# Patient Record
Sex: FEMALE | URBAN - METROPOLITAN AREA
[De-identification: names, ages, dates, MRNs, and addresses within clinical notes are randomized per-mention and may not be internally consistent; named-entity substitution may affect disease eponyms.]

---

## 2021-08-05 ENCOUNTER — APPOINTMENT (RX ONLY)
Dept: URBAN - METROPOLITAN AREA CLINIC 159 | Facility: CLINIC | Age: 71
Setting detail: DERMATOLOGY
End: 2021-08-05

## 2021-08-05 DIAGNOSIS — D18.0 HEMANGIOMA: ICD-10-CM

## 2021-08-05 DIAGNOSIS — L57.0 ACTINIC KERATOSIS: ICD-10-CM

## 2021-08-05 DIAGNOSIS — D22 MELANOCYTIC NEVI: ICD-10-CM

## 2021-08-05 DIAGNOSIS — L30.9 DERMATITIS, UNSPECIFIED: ICD-10-CM

## 2021-08-05 DIAGNOSIS — L82.1 OTHER SEBORRHEIC KERATOSIS: ICD-10-CM

## 2021-08-05 DIAGNOSIS — L81.4 OTHER MELANIN HYPERPIGMENTATION: ICD-10-CM

## 2021-08-05 PROBLEM — D18.01 HEMANGIOMA OF SKIN AND SUBCUTANEOUS TISSUE: Status: ACTIVE | Noted: 2021-08-05

## 2021-08-05 PROBLEM — D22.0 MELANOCYTIC NEVI OF LIP: Status: ACTIVE | Noted: 2021-08-05

## 2021-08-05 PROBLEM — D22.72 MELANOCYTIC NEVI OF LEFT LOWER LIMB, INCLUDING HIP: Status: ACTIVE | Noted: 2021-08-05

## 2021-08-05 PROCEDURE — 99203 OFFICE O/P NEW LOW 30 MIN: CPT | Mod: 25

## 2021-08-05 PROCEDURE — ? LIQUID NITROGEN

## 2021-08-05 PROCEDURE — 17000 DESTRUCT PREMALG LESION: CPT

## 2021-08-05 PROCEDURE — ? COUNSELING

## 2021-08-05 ASSESSMENT — LOCATION DETAILED DESCRIPTION DERM
LOCATION DETAILED: LEFT CENTRAL MALAR CHEEK
LOCATION DETAILED: LEFT MEDIAL EYEBROW
LOCATION DETAILED: RIGHT DISTAL POSTERIOR UPPER ARM
LOCATION DETAILED: MIDDLE STERNUM
LOCATION DETAILED: LEFT INSTEP
LOCATION DETAILED: EPIGASTRIC SKIN
LOCATION DETAILED: INFERIOR THORACIC SPINE
LOCATION DETAILED: LEFT UPPER CUTANEOUS LIP
LOCATION DETAILED: RIGHT PROXIMAL POSTERIOR THIGH
LOCATION DETAILED: LEFT RIB CAGE
LOCATION DETAILED: RIGHT RIB CAGE
LOCATION DETAILED: LEFT MEDIAL ELBOW
LOCATION DETAILED: LEFT LATERAL SUPERIOR CHEST
LOCATION DETAILED: RIGHT MEDIAL FRONTAL SCALP
LOCATION DETAILED: RIGHT CENTRAL EYEBROW
LOCATION DETAILED: RIGHT SUPERIOR MEDIAL MALAR CHEEK
LOCATION DETAILED: LEFT INFERIOR MEDIAL UPPER BACK
LOCATION DETAILED: RIGHT PROXIMAL RADIAL DORSAL FOREARM
LOCATION DETAILED: RIGHT DISTAL PRETIBIAL REGION
LOCATION DETAILED: LEFT DISTAL DORSAL FOREARM

## 2021-08-05 ASSESSMENT — LOCATION SIMPLE DESCRIPTION DERM
LOCATION SIMPLE: LEFT UPPER BACK
LOCATION SIMPLE: RIGHT SCALP
LOCATION SIMPLE: RIGHT POSTERIOR THIGH
LOCATION SIMPLE: LEFT PLANTAR SURFACE
LOCATION SIMPLE: RIGHT EYEBROW
LOCATION SIMPLE: LEFT ELBOW
LOCATION SIMPLE: RIGHT CHEEK
LOCATION SIMPLE: RIGHT PRETIBIAL REGION
LOCATION SIMPLE: RIGHT POSTERIOR UPPER ARM
LOCATION SIMPLE: LEFT LIP
LOCATION SIMPLE: LEFT FOREARM
LOCATION SIMPLE: LEFT CHEEK
LOCATION SIMPLE: RIGHT FOREARM
LOCATION SIMPLE: LEFT EYEBROW
LOCATION SIMPLE: CHEST
LOCATION SIMPLE: ABDOMEN
LOCATION SIMPLE: UPPER BACK

## 2021-08-05 ASSESSMENT — LOCATION ZONE DERM
LOCATION ZONE: TRUNK
LOCATION ZONE: SCALP
LOCATION ZONE: LEG
LOCATION ZONE: ARM
LOCATION ZONE: FACE
LOCATION ZONE: FEET
LOCATION ZONE: LIP

## 2021-08-05 NOTE — PROCEDURE: LIQUID NITROGEN

## 2022-09-14 ENCOUNTER — APPOINTMENT (RX ONLY)
Dept: URBAN - METROPOLITAN AREA CLINIC 159 | Facility: CLINIC | Age: 72
Setting detail: DERMATOLOGY
End: 2022-09-14

## 2022-09-14 DIAGNOSIS — D18.0 HEMANGIOMA: ICD-10-CM

## 2022-09-14 DIAGNOSIS — D22 MELANOCYTIC NEVI: ICD-10-CM

## 2022-09-14 DIAGNOSIS — L81.4 OTHER MELANIN HYPERPIGMENTATION: ICD-10-CM

## 2022-09-14 DIAGNOSIS — L82.1 OTHER SEBORRHEIC KERATOSIS: ICD-10-CM

## 2022-09-14 DIAGNOSIS — L82.0 INFLAMED SEBORRHEIC KERATOSIS: ICD-10-CM

## 2022-09-14 PROBLEM — D22.72 MELANOCYTIC NEVI OF LEFT LOWER LIMB, INCLUDING HIP: Status: ACTIVE | Noted: 2022-09-14

## 2022-09-14 PROBLEM — D18.01 HEMANGIOMA OF SKIN AND SUBCUTANEOUS TISSUE: Status: ACTIVE | Noted: 2022-09-14

## 2022-09-14 PROCEDURE — 99213 OFFICE O/P EST LOW 20 MIN: CPT | Mod: 25

## 2022-09-14 PROCEDURE — ? COUNSELING

## 2022-09-14 PROCEDURE — 17110 DESTRUCTION B9 LES UP TO 14: CPT

## 2022-09-14 PROCEDURE — ? LIQUID NITROGEN (CM)

## 2022-09-14 ASSESSMENT — LOCATION ZONE DERM
LOCATION ZONE: HAND
LOCATION ZONE: NECK
LOCATION ZONE: FEET
LOCATION ZONE: LEG
LOCATION ZONE: NOSE
LOCATION ZONE: FACE
LOCATION ZONE: TRUNK
LOCATION ZONE: ARM
LOCATION ZONE: SCALP

## 2022-09-14 ASSESSMENT — LOCATION DETAILED DESCRIPTION DERM
LOCATION DETAILED: LEFT SUPERIOR PARIETAL SCALP
LOCATION DETAILED: RIGHT SUPERIOR LATERAL FOREHEAD
LOCATION DETAILED: LEFT PROXIMAL PRETIBIAL REGION
LOCATION DETAILED: LEFT MEDIAL TRAPEZIAL NECK
LOCATION DETAILED: LEFT INFERIOR ANTERIOR NECK
LOCATION DETAILED: LEFT INFERIOR LATERAL MALAR CHEEK
LOCATION DETAILED: RIGHT RADIAL DORSAL HAND
LOCATION DETAILED: LEFT PROXIMAL CALF
LOCATION DETAILED: NASAL DORSUM
LOCATION DETAILED: RIGHT LATERAL BUCCAL CHEEK
LOCATION DETAILED: RIGHT INFERIOR CENTRAL MALAR CHEEK
LOCATION DETAILED: RIGHT DISTAL CALF
LOCATION DETAILED: EPIGASTRIC SKIN
LOCATION DETAILED: SUPERIOR MID FOREHEAD
LOCATION DETAILED: SUPERIOR THORACIC SPINE
LOCATION DETAILED: LEFT INFERIOR CENTRAL MALAR CHEEK
LOCATION DETAILED: LEFT LATERAL BUCCAL CHEEK
LOCATION DETAILED: RIGHT CENTRAL LATERAL NECK
LOCATION DETAILED: LEFT CENTRAL LATERAL NECK
LOCATION DETAILED: LEFT PROXIMAL POSTERIOR UPPER ARM
LOCATION DETAILED: LEFT ANTERIOR PROXIMAL THIGH
LOCATION DETAILED: LEFT MEDIAL BREAST 9-10:00 REGION
LOCATION DETAILED: RIGHT INFERIOR MEDIAL MIDBACK
LOCATION DETAILED: LEFT SUPERIOR FRONTAL SCALP
LOCATION DETAILED: SUBXIPHOID
LOCATION DETAILED: LEFT ANTERIOR SHOULDER
LOCATION DETAILED: RIGHT ANTERIOR SHOULDER
LOCATION DETAILED: MIDDLE STERNUM
LOCATION DETAILED: LEFT LATERAL FOREHEAD
LOCATION DETAILED: RIGHT PROXIMAL PRETIBIAL REGION
LOCATION DETAILED: RIGHT INFERIOR LATERAL MALAR CHEEK
LOCATION DETAILED: RIGHT PROXIMAL POSTERIOR UPPER ARM
LOCATION DETAILED: INFERIOR THORACIC SPINE
LOCATION DETAILED: RIGHT ANTERIOR PROXIMAL THIGH
LOCATION DETAILED: LEFT CHIN
LOCATION DETAILED: LEFT MEDIAL DORSAL FOOT
LOCATION DETAILED: LEFT ULNAR DORSAL HAND
LOCATION DETAILED: RIGHT LATERAL FOREHEAD

## 2022-09-14 ASSESSMENT — LOCATION SIMPLE DESCRIPTION DERM
LOCATION SIMPLE: RIGHT PRETIBIAL REGION
LOCATION SIMPLE: CHIN
LOCATION SIMPLE: RIGHT HAND
LOCATION SIMPLE: RIGHT CHEEK
LOCATION SIMPLE: NOSE
LOCATION SIMPLE: RIGHT POSTERIOR UPPER ARM
LOCATION SIMPLE: RIGHT SHOULDER
LOCATION SIMPLE: POSTERIOR NECK
LOCATION SIMPLE: SUPERIOR FOREHEAD
LOCATION SIMPLE: RIGHT CALF
LOCATION SIMPLE: RIGHT THIGH
LOCATION SIMPLE: LEFT HAND
LOCATION SIMPLE: UPPER BACK
LOCATION SIMPLE: NECK
LOCATION SIMPLE: LEFT FOREHEAD
LOCATION SIMPLE: SCALP
LOCATION SIMPLE: LEFT FOOT
LOCATION SIMPLE: RIGHT FOREHEAD
LOCATION SIMPLE: LEFT ANTERIOR NECK
LOCATION SIMPLE: LEFT PRETIBIAL REGION
LOCATION SIMPLE: LEFT CALF
LOCATION SIMPLE: LEFT BREAST
LOCATION SIMPLE: ABDOMEN
LOCATION SIMPLE: LEFT POSTERIOR UPPER ARM
LOCATION SIMPLE: CHEST
LOCATION SIMPLE: LEFT THIGH
LOCATION SIMPLE: LEFT CHEEK
LOCATION SIMPLE: RIGHT LOWER BACK
LOCATION SIMPLE: LEFT SHOULDER

## 2022-09-14 NOTE — PROCEDURE: LIQUID NITROGEN
Consent: The patient's consent was obtained including but not limited to risks of crusting, scabbing, blistering, scarring, darker or lighter pigmentary change, recurrence, incomplete removal and infection.
Include Z78.9 (Other Specified Conditions Influencing Health Status) As An Associated Diagnosis?: No
Detail Level: Detailed
Medical Necessity Information: It is in your best interest to select a reason for this procedure from the list below. All of these items fulfill various CMS LCD requirements except the new and changing color options.
Medical Necessity Clause: This procedure was medically necessary because the lesions that were treated were:
Post-Care Instructions: I reviewed with the patient in detail post-care instructions. Patient is to wear sunprotection, and avoid picking at any of the treated lesions. Pt may apply Vaseline to crusted or scabbing areas.
Size Of Lesion In Cm (Optional): 0

## 2022-09-28 ENCOUNTER — APPOINTMENT (RX ONLY)
Dept: URBAN - METROPOLITAN AREA CLINIC 159 | Facility: CLINIC | Age: 72
Setting detail: DERMATOLOGY
End: 2022-09-28

## 2022-09-28 DIAGNOSIS — L82.0 INFLAMED SEBORRHEIC KERATOSIS: ICD-10-CM

## 2022-09-28 DIAGNOSIS — L82.1 OTHER SEBORRHEIC KERATOSIS: ICD-10-CM

## 2022-09-28 DIAGNOSIS — Z71.89 OTHER SPECIFIED COUNSELING: ICD-10-CM

## 2022-09-28 PROCEDURE — ? COUNSELING

## 2022-09-28 PROCEDURE — 17110 DESTRUCTION B9 LES UP TO 14: CPT

## 2022-09-28 PROCEDURE — ? LIQUID NITROGEN (CM)

## 2022-09-28 PROCEDURE — 99212 OFFICE O/P EST SF 10 MIN: CPT | Mod: 25

## 2022-09-28 ASSESSMENT — LOCATION DETAILED DESCRIPTION DERM
LOCATION DETAILED: LEFT INFERIOR CENTRAL MALAR CHEEK
LOCATION DETAILED: LEFT INFERIOR MEDIAL MALAR CHEEK
LOCATION DETAILED: LEFT SUPERIOR CENTRAL MALAR CHEEK
LOCATION DETAILED: RIGHT INFERIOR CENTRAL MALAR CHEEK

## 2022-09-28 ASSESSMENT — LOCATION ZONE DERM: LOCATION ZONE: FACE

## 2022-09-28 ASSESSMENT — PAIN INTENSITY VAS: HOW INTENSE IS YOUR PAIN 0 BEING NO PAIN, 10 BEING THE MOST SEVERE PAIN POSSIBLE?: NO PAIN

## 2022-09-28 ASSESSMENT — LOCATION SIMPLE DESCRIPTION DERM
LOCATION SIMPLE: RIGHT CHEEK
LOCATION SIMPLE: LEFT CHEEK

## 2022-09-28 NOTE — PROCEDURE: LIQUID NITROGEN
Include Z78.9 (Other Specified Conditions Influencing Health Status) As An Associated Diagnosis?: No
Consent: The patient's consent was obtained including but not limited to risks of crusting, scabbing, blistering, scarring, darker or lighter pigmentary change, recurrence, incomplete removal and infection.
Detail Level: Detailed
Medical Necessity Information: It is in your best interest to select a reason for this procedure from the list below. All of these items fulfill various CMS LCD requirements except the new and changing color options.
Medical Necessity Clause: This procedure was medically necessary because the lesions that were treated were:
Post-Care Instructions: I reviewed with the patient in detail post-care instructions. Patient is to wear sunprotection, and avoid picking at any of the treated lesions. Pt may apply Vaseline to crusted or scabbing areas.
Size Of Lesion In Cm (Optional): 0
Number Of Freeze-Thaw Cycles: 3 freeze-thaw cycles

## 2023-11-08 ENCOUNTER — APPOINTMENT (RX ONLY)
Dept: URBAN - METROPOLITAN AREA CLINIC 159 | Facility: CLINIC | Age: 73
Setting detail: DERMATOLOGY
End: 2023-11-08

## 2023-11-08 DIAGNOSIS — D22 MELANOCYTIC NEVI: ICD-10-CM

## 2023-11-08 DIAGNOSIS — L57.8 OTHER SKIN CHANGES DUE TO CHRONIC EXPOSURE TO NONIONIZING RADIATION: ICD-10-CM

## 2023-11-08 DIAGNOSIS — L82.1 OTHER SEBORRHEIC KERATOSIS: ICD-10-CM

## 2023-11-08 DIAGNOSIS — L81.4 OTHER MELANIN HYPERPIGMENTATION: ICD-10-CM

## 2023-11-08 DIAGNOSIS — Z71.89 OTHER SPECIFIED COUNSELING: ICD-10-CM

## 2023-11-08 DIAGNOSIS — D18.0 HEMANGIOMA: ICD-10-CM

## 2023-11-08 PROBLEM — D18.01 HEMANGIOMA OF SKIN AND SUBCUTANEOUS TISSUE: Status: ACTIVE | Noted: 2023-11-08

## 2023-11-08 PROBLEM — D22.72 MELANOCYTIC NEVI OF LEFT LOWER LIMB, INCLUDING HIP: Status: ACTIVE | Noted: 2023-11-08

## 2023-11-08 PROCEDURE — 99213 OFFICE O/P EST LOW 20 MIN: CPT

## 2023-11-08 PROCEDURE — ? COUNSELING

## 2023-11-08 ASSESSMENT — LOCATION SIMPLE DESCRIPTION DERM
LOCATION SIMPLE: LEFT SHOULDER
LOCATION SIMPLE: RIGHT HAND
LOCATION SIMPLE: NECK
LOCATION SIMPLE: RIGHT SHOULDER
LOCATION SIMPLE: LEFT BREAST
LOCATION SIMPLE: LEFT THIGH
LOCATION SIMPLE: LEFT CALF
LOCATION SIMPLE: LEFT CHEEK
LOCATION SIMPLE: UPPER BACK
LOCATION SIMPLE: POSTERIOR NECK
LOCATION SIMPLE: RIGHT CALF
LOCATION SIMPLE: RIGHT CHEEK
LOCATION SIMPLE: RIGHT POSTERIOR UPPER ARM
LOCATION SIMPLE: LEFT ANTERIOR NECK
LOCATION SIMPLE: LEFT FOREHEAD
LOCATION SIMPLE: LEFT POSTERIOR UPPER ARM
LOCATION SIMPLE: RIGHT FOREHEAD
LOCATION SIMPLE: CHIN
LOCATION SIMPLE: RIGHT NOSE
LOCATION SIMPLE: LEFT PRETIBIAL REGION
LOCATION SIMPLE: RIGHT THIGH
LOCATION SIMPLE: LEFT HAND
LOCATION SIMPLE: RIGHT PRETIBIAL REGION
LOCATION SIMPLE: ABDOMEN
LOCATION SIMPLE: LEFT FOOT

## 2023-11-08 ASSESSMENT — LOCATION DETAILED DESCRIPTION DERM
LOCATION DETAILED: LEFT INFERIOR ANTERIOR NECK
LOCATION DETAILED: LEFT ANTERIOR PROXIMAL THIGH
LOCATION DETAILED: LEFT PROXIMAL POSTERIOR UPPER ARM
LOCATION DETAILED: RIGHT PROXIMAL PRETIBIAL REGION
LOCATION DETAILED: RIGHT ANTERIOR SHOULDER
LOCATION DETAILED: RIGHT LATERAL FOREHEAD
LOCATION DETAILED: SUBXIPHOID
LOCATION DETAILED: LEFT INFERIOR CENTRAL MALAR CHEEK
LOCATION DETAILED: LEFT INFERIOR MEDIAL MALAR CHEEK
LOCATION DETAILED: LEFT PROXIMAL PRETIBIAL REGION
LOCATION DETAILED: RIGHT CENTRAL LATERAL NECK
LOCATION DETAILED: INFERIOR THORACIC SPINE
LOCATION DETAILED: RIGHT RADIAL DORSAL HAND
LOCATION DETAILED: RIGHT INFERIOR CENTRAL MALAR CHEEK
LOCATION DETAILED: LEFT MEDIAL TRAPEZIAL NECK
LOCATION DETAILED: RIGHT NASAL ALA
LOCATION DETAILED: LEFT MEDIAL BREAST 9-10:00 REGION
LOCATION DETAILED: LEFT LATERAL FOREHEAD
LOCATION DETAILED: RIGHT DISTAL CALF
LOCATION DETAILED: RIGHT ANTERIOR PROXIMAL THIGH
LOCATION DETAILED: RIGHT INFERIOR LATERAL MALAR CHEEK
LOCATION DETAILED: LEFT INFERIOR NASAL CHEEK
LOCATION DETAILED: LEFT PROXIMAL CALF
LOCATION DETAILED: LEFT MEDIAL DORSAL FOOT
LOCATION DETAILED: LEFT INFERIOR LATERAL MALAR CHEEK
LOCATION DETAILED: LEFT CENTRAL LATERAL NECK
LOCATION DETAILED: LEFT ULNAR DORSAL HAND
LOCATION DETAILED: LEFT ANTERIOR SHOULDER
LOCATION DETAILED: RIGHT PROXIMAL POSTERIOR UPPER ARM
LOCATION DETAILED: LEFT CHIN

## 2023-11-08 ASSESSMENT — LOCATION ZONE DERM
LOCATION ZONE: FEET
LOCATION ZONE: TRUNK
LOCATION ZONE: ARM
LOCATION ZONE: HAND
LOCATION ZONE: LEG
LOCATION ZONE: NECK
LOCATION ZONE: FACE
LOCATION ZONE: NOSE

## 2024-11-25 ENCOUNTER — APPOINTMENT (RX ONLY)
Dept: URBAN - METROPOLITAN AREA CLINIC 191 | Facility: CLINIC | Age: 74
Setting detail: DERMATOLOGY
End: 2024-11-25

## 2024-11-25 DIAGNOSIS — D22 MELANOCYTIC NEVI: ICD-10-CM

## 2024-11-25 DIAGNOSIS — Z71.89 OTHER SPECIFIED COUNSELING: ICD-10-CM

## 2024-11-25 DIAGNOSIS — D18.0 HEMANGIOMA: ICD-10-CM

## 2024-11-25 DIAGNOSIS — L81.4 OTHER MELANIN HYPERPIGMENTATION: ICD-10-CM

## 2024-11-25 DIAGNOSIS — L57.0 ACTINIC KERATOSIS: ICD-10-CM

## 2024-11-25 DIAGNOSIS — L82.1 OTHER SEBORRHEIC KERATOSIS: ICD-10-CM

## 2024-11-25 PROBLEM — D18.01 HEMANGIOMA OF SKIN AND SUBCUTANEOUS TISSUE: Status: ACTIVE | Noted: 2024-11-25

## 2024-11-25 PROBLEM — D22.5 MELANOCYTIC NEVI OF TRUNK: Status: ACTIVE | Noted: 2024-11-25

## 2024-11-25 PROCEDURE — ? LIQUID NITROGEN

## 2024-11-25 PROCEDURE — 17000 DESTRUCT PREMALG LESION: CPT

## 2024-11-25 PROCEDURE — ? COUNSELING

## 2024-11-25 PROCEDURE — 99213 OFFICE O/P EST LOW 20 MIN: CPT | Mod: 25

## 2024-11-25 ASSESSMENT — LOCATION DETAILED DESCRIPTION DERM
LOCATION DETAILED: EPIGASTRIC SKIN
LOCATION DETAILED: RIGHT SUPERIOR MEDIAL UPPER BACK
LOCATION DETAILED: LEFT MEDIAL SUPERIOR CHEST
LOCATION DETAILED: PERIUMBILICAL SKIN
LOCATION DETAILED: INFERIOR THORACIC SPINE
LOCATION DETAILED: RIGHT MEDIAL UPPER BACK
LOCATION DETAILED: RIGHT INFERIOR MEDIAL MIDBACK
LOCATION DETAILED: NASAL DORSUM

## 2024-11-25 ASSESSMENT — LOCATION SIMPLE DESCRIPTION DERM
LOCATION SIMPLE: RIGHT LOWER BACK
LOCATION SIMPLE: UPPER BACK
LOCATION SIMPLE: CHEST
LOCATION SIMPLE: ABDOMEN
LOCATION SIMPLE: RIGHT UPPER BACK
LOCATION SIMPLE: NOSE

## 2024-11-25 ASSESSMENT — LOCATION ZONE DERM
LOCATION ZONE: NOSE
LOCATION ZONE: TRUNK

## 2024-11-25 NOTE — PROCEDURE: LIQUID NITROGEN
Duration Of Freeze Thaw-Cycle (Seconds): 3
Post-Care Instructions: I reviewed with the patient in detail post-care instructions. Patient is to wear sunprotection, and avoid picking at any of the treated lesions. Pt may apply Vaseline to crusted or scabbing areas.
Number Of Freeze-Thaw Cycles: 2 freeze-thaw cycles
Render Post-Care Instructions In Note?: yes
Detail Level: Detailed
Consent: The patient's consent was obtained including but not limited to risks of crusting, scabbing, blistering, scarring, darker or lighter pigmentary change, recurrence, incomplete removal and infection.
Render Note In Bullet Format When Appropriate: No
Application Tool (Optional): Liquid Nitrogen Sprayer